# Patient Record
Sex: FEMALE | Race: WHITE | NOT HISPANIC OR LATINO | Employment: FULL TIME | URBAN - METROPOLITAN AREA
[De-identification: names, ages, dates, MRNs, and addresses within clinical notes are randomized per-mention and may not be internally consistent; named-entity substitution may affect disease eponyms.]

---
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
## 2022-01-10 ENCOUNTER — OFFICE VISIT (OUTPATIENT)
Dept: FAMILY MEDICINE CLINIC | Facility: CLINIC | Age: 22
End: 2022-01-10
Payer: COMMERCIAL

## 2022-01-10 VITALS
TEMPERATURE: 97 F | SYSTOLIC BLOOD PRESSURE: 102 MMHG | HEIGHT: 70 IN | WEIGHT: 110 LBS | HEART RATE: 78 BPM | DIASTOLIC BLOOD PRESSURE: 68 MMHG | OXYGEN SATURATION: 99 % | BODY MASS INDEX: 15.75 KG/M2

## 2022-01-10 DIAGNOSIS — R63.6 UNDERWEIGHT: ICD-10-CM

## 2022-01-10 DIAGNOSIS — R63.4 WEIGHT LOSS: Primary | ICD-10-CM

## 2022-01-10 PROCEDURE — 99213 OFFICE O/P EST LOW 20 MIN: CPT | Performed by: NURSE PRACTITIONER

## 2022-01-10 PROCEDURE — 3008F BODY MASS INDEX DOCD: CPT | Performed by: NURSE PRACTITIONER

## 2022-01-10 PROCEDURE — 1036F TOBACCO NON-USER: CPT | Performed by: NURSE PRACTITIONER

## 2022-01-10 NOTE — ASSESSMENT & PLAN NOTE
Advised ensure protein drinks, at least twice daily  Maintain food diary  Refer to nutritionist for advice for weight gain

## 2022-01-10 NOTE — PROGRESS NOTES
Assessment/Plan:     Chronic Problems:  Underweight  Advised ensure protein drinks, at least twice daily  Maintain food diary  Refer to nutritionist for advice for weight gain  Visit Diagnosis:  Diagnoses and all orders for this visit:    Weight loss  -     Ambulatory referral to Nutrition Services; Future    Underweight          Subjective:    Patient ID: Igor Still is a 25 y o  female  Patient presents with concerns of low weight  She has always been very thin and has a difficult time gaining weight  She has to be 128lbs for the [employer]  She states she is eating constantly  She is eating carbs, atleast 3 meals a day, with snacks  She did have recent labs which were normal        The following portions of the patient's history were reviewed and updated as appropriate: allergies, current medications, past family history, past medical history, past social history, past surgical history and problem list     Review of Systems   Constitutional: Positive for unexpected weight change  Negative for chills and fever  HENT: Negative for ear pain and sore throat  Eyes: Negative for pain and visual disturbance  Respiratory: Negative for cough and shortness of breath  Cardiovascular: Negative for chest pain and palpitations  Gastrointestinal: Negative for abdominal pain and vomiting  Genitourinary: Negative for dysuria and hematuria  Musculoskeletal: Negative for arthralgias and back pain  Skin: Negative for color change and rash  Neurological: Negative for seizures and syncope  Psychiatric/Behavioral: Negative for dysphoric mood and sleep disturbance  The patient is not nervous/anxious  All other systems reviewed and are negative          /68   Pulse 78   Temp (!) 97 °F (36 1 °C)   Ht 5' 10" (1 778 m)   Wt 49 9 kg (110 lb)   SpO2 99%   BMI 15 78 kg/m²   Social History     Socioeconomic History    Marital status: /Civil Union     Spouse name: Not on file    Number of children: Not on file    Years of education: Not on file    Highest education level: Not on file   Occupational History    Not on file   Tobacco Use    Smoking status: Never Smoker    Smokeless tobacco: Never Used   Vaping Use    Vaping Use: Never used   Substance and Sexual Activity    Alcohol use: Yes    Drug use: Never    Sexual activity: Yes     Partners: Male     Birth control/protection: I U D  Other Topics Concern    Not on file   Social History Narrative    Not on file     Social Determinants of Health     Financial Resource Strain: Not on file   Food Insecurity: Not on file   Transportation Needs: Not on file   Physical Activity: Not on file   Stress: Not on file   Social Connections: Not on file   Intimate Partner Violence: Not on file   Housing Stability: Not on file     Past Medical History:   Diagnosis Date    Allergic rhinitis     Collar bone fracture      Family History   Problem Relation Age of Onset    Asthma Mother     Hyperlipidemia Mother     No Known Problems Father     Anxiety disorder Sister     Breast cancer Maternal Grandmother     Hyperlipidemia Maternal Grandmother     Ovarian cancer Maternal Grandmother     Heart disease Paternal Grandfather     Hypertension Maternal Uncle     Asthma Sister     Breast cancer additional onset Neg Hx     Uterine cancer Neg Hx     Cervical cancer Neg Hx      Past Surgical History:   Procedure Laterality Date    NO PAST SURGERIES       No current outpatient medications on file      Allergies   Allergen Reactions    Bee Pollen Anaphylaxis    Pollen Extract Cough and Chest Pain     Seasonal allergies          Lab Review   Appointment on 12/17/2021   Component Date Value    TSH 3RD GENERATON 12/17/2021 2 440    Office Visit on 12/15/2021   Component Date Value    URINE HCG 12/15/2021 normal     Yeast, Wet Prep 12/15/2021 neg     pH 12/15/2021 5 5     Whiff Test 12/15/2021 pos     Clue Cells 12/15/2021 pos     Trich, [employer] [address] [employer] Prep 12/15/2021 neg         Imaging: No results found  Objective:     Physical Exam  Vitals reviewed  Constitutional:       Appearance: She is well-developed  HENT:      Head: Normocephalic  Cardiovascular:      Rate and Rhythm: Normal rate and regular rhythm  Heart sounds: Normal heart sounds  Pulmonary:      Effort: Pulmonary effort is normal  No respiratory distress  Breath sounds: Normal breath sounds  No wheezing  Abdominal:      General: Bowel sounds are normal       Palpations: Abdomen is soft  Musculoskeletal:         General: Normal range of motion  Cervical back: Normal range of motion and neck supple  Lymphadenopathy:      Cervical: No cervical adenopathy  Skin:     General: Skin is warm and dry  Neurological:      Mental Status: She is alert and oriented to person, place, and time  There are no Patient Instructions on file for this visit  LINETTE Crespo    Portions of the record may have been created with voice recognition software  Occasional wrong word or "sound a like" substitutions may have occurred due to the inherent limitations of voice recognition software  Read the chart carefully and recognize, using context, where substitutions have occurred

## 2022-02-03 ENCOUNTER — TELEMEDICINE (OUTPATIENT)
Dept: FAMILY MEDICINE CLINIC | Facility: CLINIC | Age: 22
End: 2022-02-03
Payer: COMMERCIAL

## 2022-02-03 VITALS — TEMPERATURE: 102.3 F

## 2022-02-03 DIAGNOSIS — B34.9 VIRAL INFECTION, UNSPECIFIED: Primary | ICD-10-CM

## 2022-02-03 LAB — S PYO AG THROAT QL: NEGATIVE

## 2022-02-03 PROCEDURE — 87070 CULTURE OTHR SPECIMN AEROBIC: CPT | Performed by: NURSE PRACTITIONER

## 2022-02-03 PROCEDURE — 99213 OFFICE O/P EST LOW 20 MIN: CPT | Performed by: NURSE PRACTITIONER

## 2022-02-03 PROCEDURE — 87636 SARSCOV2 & INF A&B AMP PRB: CPT | Performed by: NURSE PRACTITIONER

## 2022-02-03 NOTE — PROGRESS NOTES
COVID-19 Outpatient Progress Note    Assessment/Plan:    Problem List Items Addressed This Visit     None      Visit Diagnoses     Viral infection, unspecified    -  Primary    Relevant Orders    Covid/Flu- Office Collect    POCT rapid strepA    Throat culture         Disposition:     Recommended patient to come to the office to test for COVID-19/Influenza  Discussed vitamin D, vitamin C, and/or zinc supplementation with patient  Will have patient come to office for COVID/influenza swab and to be tested for strep throat  To begin are obtained  over-the-counter Flonase, increase fluid intake, and begin gargling with salt water  Advised to begin Tylenol or Motrin to bring fever down  Discussed the importance of staying well hydrated  Follow-up if no improvement in symptoms by Monday or sooner if symptoms worsen  I have spent 10 minutes directly with the patient  Greater than 50% of this time was spent in counseling/coordination of care regarding: prognosis, risks and benefits of treatment options, patient and family education, importance of treatment compliance, risk factor reductions and impressions  Encounter provider LINETTE Fisher    Provider located at [facility] [address] [address]  [phone]    Recent Visits  No visits were found meeting these conditions  Showing recent visits within past 7 days and meeting all other requirements  Today's Visits  Date Type Provider Dept   02/03/22 Telemedicine LINETTE Fisher [address] today's visits and meeting all other requirements  Future Appointments  No visits were found meeting these conditions  Showing future appointments within next 150 days and meeting all other requirements     This virtual check-in was done via [employer] and patient was informed that this is a secure, HIPAA-compliant platform   She agrees to proceed  Patient agrees to participate in a virtual check in via telephone or video visit instead of presenting to the office to address urgent/immediate medical needs  Patient is aware this is a billable service  After connecting through [facility], the patient was identified by name and date of birth  Roverto Maria was informed that this was a telemedicine visit and that the exam was being conducted confidentially over secure lines  My office door was closed  No one else was in the room  Roverto Maria acknowledged consent and understanding of privacy and security of the telemedicine visit  I informed the patient that I have reviewed her record in Epic and presented the opportunity for her to ask any questions regarding the visit today  The patient agreed to participate  Verification of patient location:  Patient is located in the following state in which I hold an active license: [place]    Subjective:   Roverto Maria is a 25 y o  female who is concerned about COVID-19  Patient's symptoms include fever, malaise, nasal congestion, rhinorrhea, sore throat, vomiting and myalgias  Patient denies chills, anosmia, loss of taste, cough, shortness of breath, chest tightness, abdominal pain, nausea, diarrhea and headaches           COVID-19 vaccination status: Fully vaccinated (primary series)    Exposure:   Contact with a person who is under investigation (PUI) for or who is positive for COVID-19 within the last 14 days?: No    Hospitalized recently for fever and/or lower respiratory symptoms?: No      Currently a healthcare worker that is involved in direct patient care?: No      Works in a special setting where the risk of COVID-19 transmission may be high? (this may include long-term care, correctional and correction facilities; homeless shelters; assisted-living facilities and group homes ): No      Resident in a special setting where the risk of COVID-19 transmission may be high? (this may include long-term care, correctional and prison facilities; homeless shelters; assisted-living facilities and group homes ): No      Hiro Sauceda presents via virtual visit after developing a fever, sore throat, and nasal congestion  T-max 102 3° today  She has been taking over-the-counter cough and cold medication with some relief  She is fully vaccinated with COVID  Denies cough, shortness of breath, sick contacts, or wheezing  Lab Results   Component Value Date    SARSCOV2 Negative 04/27/2021    SARSCOV2 Detected (AA) 11/10/2020     Past Medical History:   Diagnosis Date    Allergic rhinitis     Collar bone fracture      Past Surgical History:   Procedure Laterality Date    NO PAST SURGERIES       No current outpatient medications on file  No current facility-administered medications for this visit  Allergies   Allergen Reactions    Bee Pollen Anaphylaxis    Pollen Extract Cough and Chest Pain     Seasonal allergies       Review of Systems   Constitutional: Positive for fever  Negative for chills  HENT: Positive for congestion, rhinorrhea and sore throat  Respiratory: Negative  Negative for cough, chest tightness and shortness of breath  Cardiovascular: Negative  Gastrointestinal: Positive for vomiting  Negative for abdominal pain, diarrhea and nausea  Musculoskeletal: Positive for myalgias  Neurological: Negative  Negative for headaches  Psychiatric/Behavioral: Negative  Objective:    Vitals:    02/03/22 1259   Temp: (!) 102 3 °F (39 1 °C)       Physical Exam  Vitals reviewed  Constitutional:       General: She is not in acute distress  Appearance: Normal appearance  She is not ill-appearing, toxic-appearing or diaphoretic  HENT:      Head: Normocephalic and atraumatic  Eyes:      Conjunctiva/sclera: Conjunctivae normal    Pulmonary:      Effort: Pulmonary effort is normal    Musculoskeletal:      Cervical back: Neck supple     Neurological:      General: No focal deficit present  Mental Status: She is alert and oriented to person, place, and time  Psychiatric:         Mood and Affect: Mood normal          Behavior: Behavior normal          Thought Content: Thought content normal          Judgment: Judgment normal          VIRTUAL VISIT DISCLAIMER    Willie Robles verbally agrees to participate in [employer]  Pt is aware that [employer] could be limited without vital signs or the ability to perform a full hands-on physical exam  Willie Robles understands she or the provider may request at any time to terminate the video visit and request the patient to seek care or treatment in person

## 2022-02-04 LAB
FLUAV RNA RESP QL NAA+PROBE: NEGATIVE
FLUBV RNA RESP QL NAA+PROBE: NEGATIVE
SARS-COV-2 RNA RESP QL NAA+PROBE: POSITIVE

## 2022-02-05 LAB — BACTERIA THROAT CULT: NORMAL

## 2022-02-16 ENCOUNTER — OFFICE VISIT (OUTPATIENT)
Dept: FAMILY MEDICINE CLINIC | Facility: CLINIC | Age: 22
End: 2022-02-16
Payer: COMMERCIAL

## 2022-02-16 VITALS
SYSTOLIC BLOOD PRESSURE: 100 MMHG | OXYGEN SATURATION: 100 % | TEMPERATURE: 98.2 F | HEART RATE: 84 BPM | HEIGHT: 70 IN | WEIGHT: 110.6 LBS | BODY MASS INDEX: 15.83 KG/M2 | DIASTOLIC BLOOD PRESSURE: 62 MMHG

## 2022-02-16 DIAGNOSIS — Z00.00 ANNUAL PHYSICAL EXAM: Primary | ICD-10-CM

## 2022-02-16 PROCEDURE — 1036F TOBACCO NON-USER: CPT | Performed by: NURSE PRACTITIONER

## 2022-02-16 PROCEDURE — 3008F BODY MASS INDEX DOCD: CPT | Performed by: NURSE PRACTITIONER

## 2022-02-16 PROCEDURE — 99395 PREV VISIT EST AGE 18-39: CPT | Performed by: NURSE PRACTITIONER

## 2022-02-16 NOTE — ASSESSMENT & PLAN NOTE
Annual physical completed  Patient needs a note for work  Discussed management of stress  Discussed  Interventions for gaining weight  Currently is working at a   Is trying to get in to school  Reports stresses with her   Denies any physical abuse  Discussed management of stress  Offered referral for therapy, patient declined at this time  Discussed self-care  Current with gyn assessment

## 2022-02-16 NOTE — PATIENT INSTRUCTIONS

## 2022-02-16 NOTE — PROGRESS NOTES
[facility] [address] [facility]    NAME: Charleen Robles  AGE: 25 y o  SEX: female  : 2000     DATE: 2022     Assessment and Plan:     Problem List Items Addressed This Visit        Other    Annual physical exam - Primary     Annual physical completed  Patient needs a note for work  Discussed management of stress  Discussed  Interventions for gaining weight  Currently is working at a   Is trying to get in to school  Reports stresses with her   Denies any physical abuse  Discussed management of stress  Offered referral for therapy, patient declined at this time  Discussed self-care  Current with gyn assessment  Immunizations and preventive care screenings were discussed with patient today  Appropriate education was printed on patient's after visit summary  Counseling:  Alcohol/drug use: discussed moderation in alcohol intake, the recommendations for healthy alcohol use, and avoidance of illicit drug use  Dental Health: discussed importance of regular tooth brushing, flossing, and dental visits  Injury prevention: discussed safety/seat belts, safety helmets, smoke detectors, carbon dioxide detectors, and smoking near bedding or upholstery  Sexual health: discussed sexually transmitted diseases, partner selection, use of condoms, avoidance of unintended pregnancy, and contraceptive alternatives  · Exercise: the importance of regular exercise/physical activity was discussed  Recommend exercise 3-5 times per week for at least 30 minutes  BMI Counseling: Body mass index is 15 87 kg/m²  The BMI is below normal  Patient advised to gain weight and dietary education for weight gain was provided  Patient referred to nutritionist  Rationale for BMI follow-up plan is due to patient being underweight  No follow-ups on file       Chief Complaint:     Chief Complaint Patient presents with    Annual Exam      History of Present Illness:     Adult Annual Physical   Patient here for a comprehensive physical exam  The patient reports problems - weight, stress  Diet and Physical Activity  · Diet/Nutrition: pt is underweight, is trying to maintain 3000 calorie diet, protein shakes  · Exercise: no formal exercise  Depression Screening  PHQ-2/9 Depression Screening         General Health  · Sleep: sleeps well  · Hearing: normal - bilateral   · Vision: no vision problems and most recent eye exam >1 year ago  · Dental: no dental visits for >1 year and brushes teeth twice daily  /GYN Health  · Last menstrual period: march 2021  ·   · Contraceptive method: IUD placement  · History of STDs?: no      Review of Systems:     Review of Systems   Constitutional: Negative  HENT: Negative  Eyes: Negative  Respiratory: Negative  Cardiovascular: Negative  Gastrointestinal: Negative  Endocrine: Negative  Genitourinary: Negative  Musculoskeletal: Negative  Skin: Negative  Allergic/Immunologic: Negative  Neurological: Negative  Psychiatric/Behavioral: Negative for dysphoric mood and suicidal ideas  The patient is nervous/anxious  Past Medical History:     Past Medical History:   Diagnosis Date    Allergic rhinitis     Collar bone fracture       Past Surgical History:     Past Surgical History:   Procedure Laterality Date    NO PAST SURGERIES        Social History:     Social History     Socioeconomic History    Marital status: /Civil Union     Spouse name: None    Number of children: None    Years of education: None    Highest education level: None   Occupational History    None   Tobacco Use    Smoking status: Never Smoker    Smokeless tobacco: Never Used   Vaping Use    Vaping Use: Never used   Substance and Sexual Activity    Alcohol use:  Yes    Drug use: Never    Sexual activity: Yes     Partners: Male     Birth control/protection: I U D  Other Topics Concern    None   Social History Narrative    None     Social Determinants of Health     Financial Resource Strain: Not on file   Food Insecurity: Not on file   Transportation Needs: Not on file   Physical Activity: Not on file   Stress: Not on file   Social Connections: Not on file   Intimate Partner Violence: Not on file   Housing Stability: Not on file      Family History:     Family History   Problem Relation Age of Onset    Asthma Mother     Hyperlipidemia Mother     No Known Problems Father     Anxiety disorder Sister     Breast cancer Maternal Grandmother     Hyperlipidemia Maternal Grandmother     Ovarian cancer Maternal Grandmother     Heart disease Paternal Grandfather     Hypertension Maternal Uncle     Asthma Sister     Breast cancer additional onset Neg Hx     Uterine cancer Neg Hx     Cervical cancer Neg Hx       Current Medications:     No current outpatient medications on file  No current facility-administered medications for this visit  Allergies: Allergies   Allergen Reactions    Bee Pollen Anaphylaxis    Pollen Extract Cough and Chest Pain     Seasonal allergies      Physical Exam:     /62 (BP Location: Left arm, Patient Position: Sitting)   Pulse 84   Temp 98 2 °F (36 8 °C) (Tympanic)   Ht 5' 10" (1 778 m)   Wt 50 2 kg (110 lb 9 6 oz)   SpO2 100%   BMI 15 87 kg/m²     Physical Exam  Constitutional:       General: She is not in acute distress  Appearance: Normal appearance  She is not ill-appearing  HENT:      Head: Normocephalic and atraumatic  Nose: Nose normal       Mouth/Throat:      Mouth: Mucous membranes are moist    Eyes:      Pupils: Pupils are equal, round, and reactive to light  Cardiovascular:      Rate and Rhythm: Normal rate and regular rhythm  Pulses: Normal pulses  Heart sounds: Normal heart sounds  Pulmonary:      Effort: Pulmonary effort is normal  No respiratory distress  Breath sounds: Normal breath sounds  Chest:      Chest wall: No tenderness  Abdominal:      General: Abdomen is flat  Bowel sounds are normal  There is no distension  Palpations: There is no mass  Tenderness: There is no abdominal tenderness  Musculoskeletal:         General: Normal range of motion  Cervical back: Normal range of motion and neck supple  Skin:     General: Skin is warm and dry  Neurological:      General: No focal deficit present  Mental Status: She is alert and oriented to person, place, and time  Psychiatric:         Mood and Affect: Mood normal          Behavior: Behavior normal          Thought Content:  Thought content normal          Judgment: Judgment normal           Anu Pryor, 1957 [facility] [address] [place] [address]